# Patient Record
(demographics unavailable — no encounter records)

---

## 2025-02-13 NOTE — PAST MEDICAL HISTORY
[Postmenopausal] : The patient is postmenopausal [Menarche Age ____] : age at menarche was [unfilled] [Menopause Age____] : age at menopause was [unfilled] [Total Preg ___] : G[unfilled] [History of Hormone Replacement Treatment] : has no history of hormone replacement treatment [FreeTextEntry6] : No [FreeTextEntry7] : No [FreeTextEntry8] : no

## 2025-02-13 NOTE — HISTORY OF PRESENT ILLNESS
[FreeTextEntry1] : Patient is a 64 yo F who present for breast cancer surveillance. Hx of LEFT lumpectomy and SNBX 3/2019 (age 57) for IDC. Surgical path yielded 0.2cm IDC (ER+/DE-/HER2-), DCIS, margins negative, 0/2 LN. Oncotype 17. S/p RT. Currently on Anastrozole (Dr. Burks). Fhx of ovarian cancer in paternal grandmother (age 50). Genetics BRCA/8 panel negative (tested 2019). Patient denies palpable masses, skin changes, or nipple discharge bilaterally.  Patient previously declined high rescreening MRIs.  TNM Staging: pT1a-b, pN0, pMX 2/6/23: iGAP  1/17/19: B/L MG- extremely dense; L- indeterminate calcs in UOQ. R wnl. BIRADS 0 2/5/19: L MG & B/L US- grouped faint and pleomorphic, increasing calcs UOQ posterior; additional faint linear calcs UOQ middle depth. B/L US wnl. 2/15/19: L Stereo Bx- IDC, DCIS. ER+ (99.8%), DE negative, HER2 negative.  Additional 1.0cm calcs are 3.1cm lateral to BX site. 3/11/19: MRI - no suspicious enhancement 3/15/19: L stereo bx- ADH 3/26/19: L needle loc lumpectomy and SNBX - 0.2cm IDC (0.5-0.7cm when measured in conjunction with prior pathology), DCIS 2/27 slides. Margins negative. 0/2 LN 1/20/20: B/l MG & US- SHALOM. 1/29/21: B/L MG & US- SHALOM 1/31/22: B/l MG & US- heterogeneously dense. SHALOM. BI-RADS 2 2/6/23: B/l MG & US-heterogeneously dense, L postsurgical changes, stable. SHALOM. BIRADS 2 2/12/24: B/L MG & US- heterogeneously dense, L postsurgical changes, stable, SHALOM. BIRADS 2.  2/13/2025: B/L MG/US: Heterogeneously dense.  L posttreatment changes.  No lymphadenopathy.  SHALOM.  BI-RADS 2

## 2025-02-13 NOTE — HISTORY OF PRESENT ILLNESS
[FreeTextEntry1] : Patient is a 62 yo F who present for breast cancer surveillance. Hx of LEFT lumpectomy and SNBX 3/2019 (age 57) for IDC. Surgical path yielded 0.2cm IDC (ER+/OK-/HER2-), DCIS, margins negative, 0/2 LN. Oncotype 17. S/p RT. Currently on Anastrozole (Dr. Burks). Fhx of ovarian cancer in paternal grandmother (age 50). Genetics BRCA/8 panel negative (tested 2019). Patient denies palpable masses, skin changes, or nipple discharge bilaterally.  Patient previously declined high rescreening MRIs.  TNM Staging: pT1a-b, pN0, pMX 2/6/23: iGAP  1/17/19: B/L MG- extremely dense; L- indeterminate calcs in UOQ. R wnl. BIRADS 0 2/5/19: L MG & B/L US- grouped faint and pleomorphic, increasing calcs UOQ posterior; additional faint linear calcs UOQ middle depth. B/L US wnl. 2/15/19: L Stereo Bx- IDC, DCIS. ER+ (99.8%), OK negative, HER2 negative.  Additional 1.0cm calcs are 3.1cm lateral to BX site. 3/11/19: MRI - no suspicious enhancement 3/15/19: L stereo bx- ADH 3/26/19: L needle loc lumpectomy and SNBX - 0.2cm IDC (0.5-0.7cm when measured in conjunction with prior pathology), DCIS 2/27 slides. Margins negative. 0/2 LN 1/20/20: B/l MG & US- SHALOM. 1/29/21: B/L MG & US- SHLAOM 1/31/22: B/l MG & US- heterogeneously dense. SHALOM. BI-RADS 2 2/6/23: B/l MG & US-heterogeneously dense, L postsurgical changes, stable. SHALOM. BIRADS 2 2/12/24: B/L MG & US- heterogeneously dense, L postsurgical changes, stable, SHALOM. BIRADS 2.  2/13/2025: B/L MG/US: Heterogeneously dense.  L posttreatment changes.  No lymphadenopathy.  SHALOM.  BI-RADS 2

## 2025-02-13 NOTE — PHYSICAL EXAM
[Normocephalic] : normocephalic [EOMI] : extra ocular movement intact [Supple] : supple [No Supraclavicular Adenopathy] : no supraclavicular adenopathy [No Cervical Adenopathy] : no cervical adenopathy [de-identified] : R breast/axilla/supraclavicular area: No masses, discharge, or adenopathy\par   [de-identified] : L breast/axilla/supraclavicular area: No evidence of recurrence\par

## 2025-02-13 NOTE — PHYSICAL EXAM
[Normocephalic] : normocephalic [EOMI] : extra ocular movement intact [Supple] : supple [No Supraclavicular Adenopathy] : no supraclavicular adenopathy [No Cervical Adenopathy] : no cervical adenopathy [de-identified] : R breast/axilla/supraclavicular area: No masses, discharge, or adenopathy\par   [de-identified] : L breast/axilla/supraclavicular area: No evidence of recurrence\par